# Patient Record
Sex: FEMALE | Race: WHITE
[De-identification: names, ages, dates, MRNs, and addresses within clinical notes are randomized per-mention and may not be internally consistent; named-entity substitution may affect disease eponyms.]

---

## 2017-12-29 ENCOUNTER — HOSPITAL ENCOUNTER (OUTPATIENT)
Dept: HOSPITAL 47 - RADECHMAIN | Age: 77
Discharge: HOME | End: 2017-12-29
Payer: MEDICARE

## 2017-12-29 DIAGNOSIS — I08.1: Primary | ICD-10-CM

## 2017-12-29 DIAGNOSIS — I65.23: ICD-10-CM

## 2017-12-29 PROCEDURE — 93306 TTE W/DOPPLER COMPLETE: CPT

## 2017-12-29 PROCEDURE — 93880 EXTRACRANIAL BILAT STUDY: CPT

## 2017-12-29 NOTE — US
EXAMINATION TYPE: US carotid duplex BILAT

 

DATE OF EXAM: 12/29/2017

 

COMPARISON: NONE

 

CLINICAL HISTORY: I65.23 Occlusion/Stenosis,I34.0 Nonrheumatic mitral.

 

EXAM MEASUREMENTS: 

 

RIGHT:  Peak Systolic Velocity (PSV) cm/sec

----- Right CCA:  66.1  

----- Right ICA:  87.1     

----- Right ECA:  61.2   

ICA/CCA ratio:  1.3    

 

RIGHT:  End Diastole cm/sec

----- Right CCA:  27.3   

----- Right ICA:  38.6      

----- Right ECA:  11.6     

 

LEFT:  Peak Systolic Velocity (PSV) cm/sec

----- Left CCA:  62.5  

----- Left ICA:  87.1   

----- Left ECA:  40.0  

ICA/CCA ratio:  1.4  

 

LEFT:  End Diastole cm/sec

----- Left CCA:  30.7  

----- Left ICA:  33.8   

----- Left ECA:  8.6 

 

VERTEBRALS (direction of flow):

Right Vertebral: Antegrade

Left Vertebral: Antegrade

 

Rhythm:  Normal

 

 

Grayscale images show mild eccentric plaque at right carotid bulb. There is more moderate eccentric p
laque at left carotid bulb. Velocity measurements and ratios remain within normal limits in visualize
d portion of both internal carotid arteries.

 

IMPRESSION:  There is mild to moderate left greater than right atherosclerotic change without hemodyn
amically significant stenosis seen in either internal carotid artery.   

 

 

Criteria for Assigning % of Stenosis / Diameter reduction

(Estimation based on the indirect measurements of the internal carotid artery velocities (ICA PSV).

1.  Normal (no stenosis)=ICA PSV < 125 cm/s: ratio < 2.0: ICA EDV<40 cm/s.

2. Less than 50% stenosis=ICA PSV < 125 cm/s: ratio < 2.0: ICA EDV<40 cm/s.

3.  50 to 69% stenosis=ICA PSV of 125 to 230 cm/s: ration 2.0 ? 4.0: ICA EDV  cm/s.

4.  Greater than 70% stenosis to near occlusion= ICA PSV > 230 cm/s: ratio > 4.0: ICA EDV > 100 cm/s.
 

5.  Near occlusion= ICA PSV velocities may be low or undetectable: variable ratio and ICA EDV.

6.  Total occlusion=unable to detect flow.

## 2017-12-30 NOTE — ECHOF
Referral Reason:I65.23 Occlusion/Stenosis,I34.0 Nonrheumatic eyal



MEASUREMENTS

--------

HEIGHT: 132.1 cm

WEIGHT: 63.0 kg

BP: 

IVSd:   0.9 cm     (0.6 - 1.1)

LVIDd:   3.8 cm     (3.9 - 5.3)

LVPWd:   1.1 cm     (0.6 - 1.1)

IVSs:   1.3 cm

LVIDs:   2.2 cm

LVPWs:   1.0 cm

Ao Diam:   2.8 cm     (2.0 - 3.7)

AV Cusp:   1.8 cm     (1.5 - 2.6)

LA Diam:   3.4 cm     (2.7 - 3.8)

MV EXCURSION:   14.317 mm     (> 18.000)

MV EF SLOPE:   76 mm/s     (70 - 150)

EPSS:   0.3 cm

MV E Augustus:   0.55 m/s

MV DecT:   244 ms

MV A Augustus:   0.94 m/s

MV E/A Ratio:   0.59 

RAP:   5.00 mmHg

RVSP:   31.41 mmHg







FINDINGS

--------

Sinus rhythm.

This was a technically good study.

LV size, wall thickness and systolic function are normal, with an EF greater than 55%.

The right ventricle is normal in size.

The left atrial size is normal.

The right atrial size is normal.

The aortic valve is trileaflet, and appears structurally normal. No aortic stenosis or regurgitation.


Mild mitral annular calcification present.   Mild mitral regurgitation is present.

Mild tricuspid regurgitation present.   There is no evidence of pulmonary hypertension.   The right v
entricular systolic pressure, as measured by Doppler, is 31.41mmHg.

There is no pulmonic regurgitation present.

The aortic root size is normal.

There is no pericardial effusion.



CONCLUSIONS

--------

1. Sinus rhythm.

2. This was a technically good study.

3. LV size, wall thickness and systolic function are normal, with an EF greater than 55%.

4. The left atrial size is normal.

5. The aortic valve is trileaflet, and appears structurally normal. No aortic stenosis or regurgitati
on.

6. Mild mitral regurgitation is present.

7. Mild tricuspid regurgitation present.

8. There is no evidence of pulmonary hypertension.

9. There is no pulmonic regurgitation present.

10. The aortic root size is normal.

11. There is no pericardial effusion.





SONOGRAPHER: Peggy James RDCS

## 2018-01-18 ENCOUNTER — HOSPITAL ENCOUNTER (OUTPATIENT)
Dept: HOSPITAL 47 - RADBDWWP | Age: 78
Discharge: HOME | End: 2018-01-18
Payer: MEDICARE

## 2018-01-18 ENCOUNTER — HOSPITAL ENCOUNTER (OUTPATIENT)
Dept: HOSPITAL 47 - RADMAMWWP | Age: 78
Discharge: HOME | End: 2018-01-18
Payer: MEDICARE

## 2018-01-18 DIAGNOSIS — M85.80: Primary | ICD-10-CM

## 2018-01-18 DIAGNOSIS — R92.2: Primary | ICD-10-CM

## 2018-01-18 PROCEDURE — 77080 DXA BONE DENSITY AXIAL: CPT

## 2018-01-18 PROCEDURE — 77066 DX MAMMO INCL CAD BI: CPT

## 2018-01-18 NOTE — BD
EXAMINATION TYPE: MG DEXA axial skeleton.  

 

DATE OF EXAM: 1/18/2018

 

COMPARISON: NONE

 

CLINICAL HISTORY: 77 YR OLD FEMALE....ICD-10 CODE:    M89.9 DISORDER OF BONE

 

 

Height:  58

Weight:  137

 

FRAX RISK QUESTIONS:

Alcohol (3 or more units per day):  NO

Family History (Parent hip fracture):  NO

Glucocorticoids (More than 3mos):  NO

           (Ex: prednisone, prednisolone, methylprednisolone, dexamethasone, and hydrocortisone).    
     

History of Fracture in Adulthood: NO

Secondary Osteoporosis: NO

  1.  Type 1 Diabetes: NO

  2.  Hyperthyroidism: NO

  3.  Menopause before 45: NO

  4.  Malnutrition: NO

  5.  Chronic liver disease: NO

Rheumatoid Arthritis: NO

Current Tobacco Use: VAP, YES

 

RISK FACTORS 

HISTORY OF: 

Family History of Osteoporosis: YES, DAUGHTER 

Active: YES

Diet low in dairy products/other sources of calcium:  YES, LOW

Postmenopausal woman: AT AGE 55 YRS OLD

Lost more than 2 inches in height since high school: YES

Hyperparathyroidism: NO

Adrenal Insufficiency: NO

 

MEDICATIONS: 

 

Thyroid Medications:  YES, SYNTHROID

How Long: 15 YRS

Additional Medications: BP MEDS, LORAZEPAM, ASPIRIN, REFLUX MEDS, STATINS FOR CHOLESTEROL,   

 

 

Additional History: HYPERTENSION,

 

 

EXAM MEASUREMENTS: 

Bone mineral densitometry was performed using the hipix System.

Bone mineral density as measured about the Lumbar spine is:  

----- L1-L4(G/cm2): 1.401

T Score Values are as follows:

----- L1:  1.0

----- L2:  1.1

----- L3:  3.2

----- L4:  1.9

----- L1-L4: 1.8

Bone mineral density  FIRST BONE DENSITY TEST HERE AT [facility]

 

Bone mineral density about the R hip (g/cm2): 0.989

Bone mineral density about the L hip (g/cm2):  0.950

T Score values are as follows:

-----R Neck: -1.6

-----L Neck:  -1.7

-----R Total:  -0.1

-----L Total: -0.5

 

 

FRAX%'S:   THERE IS A 13.0% CHANCE OF A MAJOR OSTEOPOROTIC FX AND A 4.5% FOR HIP FX.....PROBABILITY O
F FX IN 10 YRS TIME

 

IMPRESSION:

Osteopenia (T Score between -2.5 and -1 as noted by T score values

There is slightly increased risk of fracture and the patient may be considered 

for treatment. Re-Screen 2-5 years.

 

 

 

 

 

NOTE:  T-SCORE=SD OF THE YOUNG ADULT MEAN.

## 2018-01-19 NOTE — MM
Reason for exam: clinical finding.

Last mammogram was performed 13 years and 9 months ago.



History:

Patient is postmenopausal.

Family history of breast cancer.

Indicated problem(s): palpable abnormality and lump or thickening in the left 

breast.



Physical Findings:

Nurse did not find any significant physical abnormalities on exam.



MG 3D Diag Mammo W/Cad GARTH

Bilateral CC and MLO view(s) were taken.

Prior study comparison: April 12, 2004, bilateral screening mammogram.  December 27, 2002, bilateral special view mammogram.

There are scattered fibroglandular densities.  Intramammary lymph node upper outer

quadrant bilaterally.

No significant new findings when compared with previous films.



These results were verbally communicated with the patient and result sheet given 

to the patient on 01/18/18.





ASSESSMENT: Benign, BI-RAD 2



RECOMMENDATION:

Routine screening mammogram of both breasts in 1 year.



Manage patient on a clinical basis.

## 2019-04-08 ENCOUNTER — HOSPITAL ENCOUNTER (OUTPATIENT)
Dept: HOSPITAL 47 - RADMAMWWP | Age: 79
Discharge: HOME | End: 2019-04-08
Attending: INTERNAL MEDICINE
Payer: MEDICARE

## 2019-04-08 DIAGNOSIS — Z12.31: Primary | ICD-10-CM

## 2019-04-08 PROCEDURE — 77067 SCR MAMMO BI INCL CAD: CPT

## 2019-04-08 PROCEDURE — 77063 BREAST TOMOSYNTHESIS BI: CPT

## 2019-04-10 NOTE — MM
Reason for exam: screening  (asymptomatic).

Last mammogram was performed 1 year and 3 months ago.



History:

Patient is postmenopausal.



Physical Findings:

A clinical breast exam by your physician is recommended on an annual basis and 

results should be correlated with mammographic findings.



MG 3D Screening Mammo W/Cad

Bilateral CC and MLO view(s) were taken.

Prior study comparison: January 18, 2018, bilateral MG 3d diag mammo w/cad GARTH.  

April 12, 2004, bilateral screening mammogram.

There are scattered fibroglandular densities.  No suspicious abnormality.  No 

significant changes when compared with prior studies.





ASSESSMENT: Negative, BI-RAD 1



RECOMMENDATION:

Routine screening mammogram of both breasts in 1 year.

## 2020-02-20 ENCOUNTER — HOSPITAL ENCOUNTER (OUTPATIENT)
Dept: HOSPITAL 47 - RADECHMAIN | Age: 80
Discharge: HOME | End: 2020-02-20
Attending: INTERNAL MEDICINE
Payer: MEDICARE

## 2020-02-20 DIAGNOSIS — I27.20: ICD-10-CM

## 2020-02-20 DIAGNOSIS — I65.23: Primary | ICD-10-CM

## 2020-02-20 DIAGNOSIS — I08.1: ICD-10-CM

## 2020-02-20 PROCEDURE — 93880 EXTRACRANIAL BILAT STUDY: CPT

## 2020-02-20 PROCEDURE — 93306 TTE W/DOPPLER COMPLETE: CPT

## 2020-02-20 NOTE — US
EXAMINATION TYPE: US carotid duplex BILAT

 

DATE OF EXAM: 2/20/2020

 

COMPARISON: NONE

 

CLINICAL HISTORY: 79-year-old female I65.23 CAROTID OCCLUSION AND STENOSIS. Stenosis

 

TECHNIQUE: Carotid duplex ultrasound examination. In the right Doppler criteria was utilized.

 

FINDINGS:

 

EXAM MEASUREMENTS: 

 

RIGHT:  Peak Systolic Velocity (PSV) cm/sec

----- Right CCA:  69.0  

----- Right ICA:  81.5     

----- Right ECA:  67.9   

ICA/CCA ratio:  1.2    

 

RIGHT:  End Diastole cm/sec

----- Right CCA:  31.1   

----- Right ICA:  29.2      

----- Right ECA:  14.3     

 

LEFT:  Peak Systolic Velocity (PSV) cm/sec

----- Left CCA:  78.8  

----- Left ICA:  84.3   

----- Left ECA:  83.6  

ICA/CCA ratio:  1.1  

 

LEFT:  End Diastole cm/sec

----- Left CCA:  29.9  

----- Left ICA:  31.9   

----- Left ECA:  20.4 

 

VERTEBRALS (direction of flow):

Right Vertebral: Antegrade

Left Vertebral: Antegrade

 

Rhythm:  Normal

 

Sonographer notes: The vessels dive deep. No significant stenosis seen

 

 

 

IMPRESSION:  

No hemodynamically significant ICA stenosis on either side.

   

 

 

Criteria for Assigning % of Stenosis / Diameter reduction

(Estimation based on the indirect measurements of the internal carotid artery velocities (ICA PSV).

1.  Normal (no stenosis)=ICA PSV < 125 cm/s: ratio < 2.0: ICA EDV<40 cm/s.

2. Less than 50% stenosis=ICA PSV < 125 cm/s: ratio < 2.0: ICA EDV<40 cm/s.

3.  50 to 69% stenosis=ICA PSV of 125 to 230 cm/s: ration 2.0 ? 4.0: ICA EDV  cm/s.

4.  Greater than 70% stenosis to near occlusion= ICA PSV > 230 cm/s: ratio > 4.0: ICA EDV > 100 cm/s.
 

5.  Near occlusion= ICA PSV velocities may be low or undetectable: variable ratio and ICA EDV.

6.  Total occlusion=unable to detect flow.

## 2020-02-20 NOTE — ECHOF
Referral Reason:I34.0 Nonrheumatic mitral (valve) insufficiency



MEASUREMENTS

--------

HEIGHT: 147.3 cm

WEIGHT: 61.2 kg

BP: 

IVSd:   1.2 cm     (0.6 - 1.1)

LVIDd:   3.3 cm     (3.9 - 5.3)

LVPWd:   1.0 cm     (0.6 - 1.1)

IVSs:   1.2 cm

LVIDs:   2.3 cm

LVPWs:   1.5 cm

RVIDd:   3.0 cm     (< 3.3)

LAESV Index (A-L):   17.99 ml/m

Ao Diam:   2.0 cm     (2.0 - 3.7)

AV Cusp:   1.6 cm     (1.5 - 2.6)

EPSS:   0.4 cm

MV E Augustus:   0.92 m/s

MV DecT:   236 ms

MV A Augustus:   1.18 m/s

MV E/A Ratio:   0.78 

RAP:   5.00 mmHg

RVSP:   35.15 mmHg

MV EF SLOPE:   29.09 mm/s     (70 - 150)

MV EXCURSION:   11.54 mm     (> 18.000)







FINDINGS

--------

Sinus rhythm.

This was a technically adequate study.

The left ventricular size is normal.   There is mild concentric left ventricular hypertrophy.   Overa
ll left ventricular systolic function is normal with, an EF between 55 - 60 %.   The diastolic fillin
g pattern is normal for the age of the patient 13.49.

The right ventricle is normal in size.

Normal LA  size by volume 22+/-6 ml/m2.

The right atrial size is normal.

Interatrial and interventricular septum intact.

There is no evidence of aortic regurgitation.   There is no evidence of aortic stenosis.

Mild mitral regurgitation is present.

Mild tricuspid regurgitation present.   There is mild pulmonary hypertension.   The right ventricular
 systolic pressure, as measured by Doppler, is 35.15mmHg.

There is no pulmonic regurgitation present.

The aortic root size is normal.

Normal inferior vena cava with normal inspiratory collapse consistent with estimated right atrial pre
ssure of  5 mmHg.

There is no pericardial effusion.



CONCLUSIONS

--------

1. Sinus rhythm.

2. This was a technically adequate study.

3. The left ventricular size is normal.

4. There is mild concentric left ventricular hypertrophy.

5. Overall left ventricular systolic function is normal with, an EF between 55 - 60 %.

6. The diastolic filling pattern is normal for the age of the patient 13.49

7. The right ventricle is normal in size.

8. Normal LA size by volume 22+/-6 ml/m2.

9. The right atrial size is normal.

10. Interatrial and interventricular septum intact.

11. There is no evidence of aortic regurgitation.

12. There is no evidence of aortic stenosis.

13. Mild mitral regurgitation is present.

14. Mild tricuspid regurgitation present.

15. There is mild pulmonary hypertension.

16. The right ventricular systolic pressure, as measured by Doppler, is 35.15mmHg.

17. There is no pulmonic regurgitation present.

18. The aortic root size is normal.

19. Normal inferior vena cava with normal inspiratory collapse consistent with estimated right atrial
 pressure of  5 mmHg.

20. There is no pericardial effusion.





SONOGRAPHER: Susie Granda RDCS

## 2021-06-28 ENCOUNTER — HOSPITAL ENCOUNTER (OUTPATIENT)
Dept: HOSPITAL 47 - RADECHMAIN | Age: 81
Discharge: HOME | End: 2021-06-28
Attending: FAMILY MEDICINE
Payer: MEDICARE

## 2021-06-28 DIAGNOSIS — I08.8: Primary | ICD-10-CM

## 2021-06-28 DIAGNOSIS — I27.20: ICD-10-CM

## 2021-06-28 PROCEDURE — 93306 TTE W/DOPPLER COMPLETE: CPT

## 2021-06-28 PROCEDURE — 93005 ELECTROCARDIOGRAM TRACING: CPT

## 2021-06-29 NOTE — ECHOF
Referral Reason:R01.1 Cardiac Murmur



MEASUREMENTS

--------

HEIGHT: 149.9 cm

WEIGHT: 58.1 kg

BP: 145/82

RVIDd:   2.8 cm     (< 3.3)

IVSd:   1.1 cm     (0.6 - 1.1)

LVIDd:   3.7 cm     (3.9 - 5.3)

LVPWd:   1.0 cm     (0.6 - 1.1)

IVSs:   1.5 cm

LVIDs:   2.4 cm

LVPWs:   1.2 cm

LA Diam:   2.7 cm     (2.7 - 3.8)

LAESV Index (A-L):   21.42 ml/m

Ao Diam:   2.9 cm     (2.0 - 3.7)

AV Cusp:   1.8 cm     (1.5 - 2.6)

MV EXCURSION:   13.970 mm     (> 18.000)

MV EF SLOPE:   49 mm/s     (70 - 150)

EPSS:   0.4 cm

MV E Augustus:   1.06 m/s

MV DecT:   259 ms

MV A Augustus:   1.26 m/s

MV E/A Ratio:   0.84 

RAP:   5.00 mmHg

RVSP:   36.87 mmHg







FINDINGS

--------

Sinus rhythm.

This was a technically adequate study.

The left ventricular size is normal.   There is borderline concentric left ventricular hypertrophy.  
 Overall left ventricular systolic function is normal with, an EF between 60 - 65 %.

The right ventricle is normal in size.

Normal LA  size by volume 22+/-6 ml/m2.

The right atrium is normal in size.

Interatrial and interventricular septum intact.

There is mild aortic valve sclerosis.

The mitral valve leaflets are mildly thickened.   Mild mitral annular calcification present.   There 
is trace to mild mitral regurgitation.

Mild tricuspid regurgitation present.   There is mild pulmonary hypertension.   The right ventricular
 systolic pressure, as measured by Doppler, is 36.87mmHg.

Trace/mild (physiologic)  pulmonic regurgitation.

The aortic root size is normal.

Normal inferior vena cava with normal inspiratory collapse consistent with estimated right atrial pre
ssure of  5 mmHg.

There is no pericardial effusion.



CONCLUSIONS

--------

1. The left ventricular size is normal.

2. There is borderline concentric left ventricular hypertrophy.

3. Overall left ventricular systolic function is normal with, an EF between 60 - 65 %.

4. There is mild aortic valve sclerosis.

5. The mitral valve leaflets are mildly thickened.

6. Mild mitral annular calcification present.

7. There is trace to mild mitral regurgitation.

8. Mild tricuspid regurgitation present.

9. There is mild pulmonary hypertension.

10. The right ventricular systolic pressure, as measured by Doppler, is 36.87mmHg.

11. Trace/mild (physiologic)  pulmonic regurgitation.

12. There is no pericardial effusion.





SONOGRAPHER: Kathryn Harding RDCS

## 2021-06-30 ENCOUNTER — HOSPITAL ENCOUNTER (OUTPATIENT)
Dept: HOSPITAL 47 - RADBDWWP | Age: 81
Discharge: HOME | End: 2021-06-30
Attending: FAMILY MEDICINE
Payer: MEDICARE

## 2021-06-30 DIAGNOSIS — Z78.0: ICD-10-CM

## 2021-06-30 DIAGNOSIS — M85.89: ICD-10-CM

## 2021-06-30 DIAGNOSIS — Z13.820: Primary | ICD-10-CM

## 2021-06-30 PROCEDURE — 77080 DXA BONE DENSITY AXIAL: CPT

## 2022-07-18 ENCOUNTER — HOSPITAL ENCOUNTER (OUTPATIENT)
Dept: HOSPITAL 47 - RADXRMAIN | Age: 82
Discharge: HOME | End: 2022-07-18
Attending: NURSE PRACTITIONER
Payer: MEDICARE

## 2022-07-18 DIAGNOSIS — R10.9: ICD-10-CM

## 2022-07-18 DIAGNOSIS — R31.9: Primary | ICD-10-CM

## 2022-07-18 PROCEDURE — 74018 RADEX ABDOMEN 1 VIEW: CPT

## 2022-07-18 NOTE — XR
EXAMINATION TYPE: XR KUB

 

DATE OF EXAM: 7/18/2022

 

COMPARISON: NONE

 

HISTORY: Pain

 

TECHNIQUE: Single supine KUB image of the abdomen is obtained

 

FINDINGS:  

Small bowel demonstrates no evidence for dilatation or air fluid levels.  

 

Gas and fecal material is seen in non-distended colon.  

 

No convincing evidence for pneumoperitoneum.

 

 No unusual calcifications. 

 

The lung bases are clear. 

 

The osseous structures are intact. Mild fecal stasis noted.

 

IMPRESSION:  

 

1.  Overall nonobstructive bowel gas pattern.

## 2022-11-07 ENCOUNTER — HOSPITAL ENCOUNTER (OUTPATIENT)
Dept: HOSPITAL 47 - LABWHC1 | Age: 82
Discharge: HOME | End: 2022-11-07
Attending: UROLOGY
Payer: MEDICARE

## 2022-11-07 DIAGNOSIS — Z01.812: Primary | ICD-10-CM

## 2022-11-07 DIAGNOSIS — C66.2: ICD-10-CM

## 2022-11-07 LAB
BASOPHILS # BLD AUTO: 0.04 X 10*3/UL (ref 0–0.1)
BASOPHILS NFR BLD AUTO: 0.6 %
EOSINOPHIL # BLD AUTO: 0.17 X 10*3/UL (ref 0.04–0.35)
EOSINOPHIL NFR BLD AUTO: 2.5 %
ERYTHROCYTE [DISTWIDTH] IN BLOOD BY AUTOMATED COUNT: 4.85 X 10*6/UL (ref 4.1–5.2)
ERYTHROCYTE [DISTWIDTH] IN BLOOD: 15.7 % (ref 11.5–14.5)
HCT VFR BLD AUTO: 44.2 % (ref 37.2–46.3)
HGB BLD-MCNC: 14.2 G/DL (ref 12–15)
IMM GRANULOCYTES BLD QL AUTO: 0.3 %
LYMPHOCYTES # SPEC AUTO: 1.67 X 10*3/UL (ref 0.9–5)
LYMPHOCYTES NFR SPEC AUTO: 24.5 %
MCH RBC QN AUTO: 29.3 PG (ref 27–32)
MCHC RBC AUTO-ENTMCNC: 32.1 G/DL (ref 32–37)
MCV RBC AUTO: 91.1 FL (ref 80–97)
MONOCYTES # BLD AUTO: 0.79 X 10*3/UL (ref 0.2–1)
MONOCYTES NFR BLD AUTO: 11.6 %
NEUTROPHILS # BLD AUTO: 4.12 X 10*3/UL (ref 1.8–7.7)
NEUTROPHILS NFR BLD AUTO: 60.5 %
NRBC BLD AUTO-RTO: 0 /100 WBCS (ref 0–0)
PLATELET # BLD AUTO: 261 X 10*3/UL (ref 140–440)
WBC # BLD AUTO: 6.81 X 10*3/UL (ref 4.5–10)

## 2022-11-07 PROCEDURE — 86850 RBC ANTIBODY SCREEN: CPT

## 2022-11-07 PROCEDURE — 80048 BASIC METABOLIC PNL TOTAL CA: CPT

## 2022-11-07 PROCEDURE — 85025 COMPLETE CBC W/AUTO DIFF WBC: CPT

## 2022-11-07 PROCEDURE — 36415 COLL VENOUS BLD VENIPUNCTURE: CPT

## 2022-11-07 PROCEDURE — 86900 BLOOD TYPING SEROLOGIC ABO: CPT

## 2022-11-07 PROCEDURE — 86901 BLOOD TYPING SEROLOGIC RH(D): CPT

## 2022-11-08 LAB
ANION GAP SERPL CALC-SCNC: 9.7 MMOL/L (ref 10–18)
BUN SERPL-SCNC: 10.4 MG/DL (ref 9–27)
BUN/CREAT SERPL: 9.45 RATIO (ref 12–20)
CALCIUM SPEC-MCNC: 10 MG/DL (ref 8.7–10.3)
CHLORIDE SERPL-SCNC: 106 MMOL/L (ref 96–109)
CO2 SERPL-SCNC: 27.3 MMOL/L (ref 20–27.5)
GLUCOSE SERPL-MCNC: 77 MG/DL (ref 70–110)
POTASSIUM SERPL-SCNC: 4.9 MMOL/L (ref 3.5–5.5)
SODIUM SERPL-SCNC: 143 MMOL/L (ref 135–145)

## 2022-11-15 ENCOUNTER — HOSPITAL ENCOUNTER (OUTPATIENT)
Dept: HOSPITAL 47 - LABPAT | Age: 82
Discharge: HOME | End: 2022-11-15
Attending: UROLOGY
Payer: MEDICARE

## 2022-11-15 DIAGNOSIS — Z01.812: Primary | ICD-10-CM

## 2022-11-15 DIAGNOSIS — C66.2: ICD-10-CM

## 2022-11-15 PROCEDURE — 93005 ELECTROCARDIOGRAM TRACING: CPT

## 2022-11-15 NOTE — P.GSHP
History of Present Illness


H&P Date: 11/15/22


Chief Complaint: Gross hematuria


The patient is an 82-year-old white female who presented with a four-month 

history of intermittent gross hematuria associated with left flank pain.  

Computed tomography scan showed a questionable soft tissue filling defect within

the left distal ureter at the ureterovesical junction.  She underwent cystoscopy

with left ureteroscopy.  No bladder tumors were seen, but she was confirmed to 

have a large tumor within the left distal ureter.  Biopsies showed high-grade, 

noninvasive urothelial carcinoma.  She was offered the options of a left 

nephroureterectomy versus a left distal ureterectomy with reimplant and has 

elected to undergo the latter.








- Constitutional


Constitutional: Reports weakness, Denies chills, Denies fever





- Cardiovascular


Cardiovascular: Reports high blood pressure





- Respiratory


Respiratory: Reports dyspnea





- Gastrointestinal


Gastrointestinal: Reports heartburn





- Genitourinary (Female)


Genitourinary: Reports flank pain, Reports hematuria





Past Medical History


Past Medical History: Hyperlipidemia, Hypertension, Thyroid Disorder


Additional Past Medical History / Comment(s): cancer in the left ureter. removed

cancer and reattach . 2 months of UTI and not being cleared. saw Dr Pena and 

DX, Hx of bronchitis started using inhaler. uses for wheezing and SOB. some 

panic issues. occasional heartburn, arthritis in hands, arms and legs. some 

numbness in hands at times. leg cramps. cataracts removed bilaterally. seasonal 

allergies.


History of Any Multi-Drug Resistant Organisms: None Reported


Past Surgical History:  Section, Hernia Repair


Additional Past Surgical History / Comment(s): left great toe amputation.  

Hiatal hernia repair.   rt  parathryoid removed


Past Anesthesia/Blood Transfusion Reactions: No Reported Reaction


Additional Past Anesthesia/Blood Transfusion Reaction / Comment(s): blood 

transfusion as a baby. - no issues


Smoking Status: Current every day smoker





- Past Family History


  ** Mother


History Unknown: Yes


Additional Family Medical History / Comment(s): adopted





Medications and Allergies


                                Home Medications











 Medication  Instructions  Recorded  Confirmed  Type


 


ALPRAZolam [Xanax] 0.25 mg PO TID PRN 22 History


 


Acetaminophen Tab [Tylenol Tab] 500 mg PO AS DIRECTED PRN 22 

History


 


Donepezil [Aricept] 10 mg PO HS 22 History


 


Ipratropium-Albuterol Nebulize 3 ml INHALATION TID PRN 22 History





[Duoneb 0.5 mg-3 mg/3 ml Soln]    


 


Levothyroxine Sodium [Synthroid] 50 mcg PO DAILY 22 History


 


Rosuvastatin Calcium 20 mg PO HS 22 History


 


Unk Vitamin B12  1 tab PO DAILY 22 History


 


dilTIAZem HCL [Cardizem CD] 240 mg PO HS 22 History








                                    Allergies











Allergy/AdvReac Type Severity Reaction Status Date / Time


 


adhesive tape Allergy  Rash/Hives Verified 22 15:16


 


contact metal agent Allergy  Rash/Hives Verified 22 14:10


 


latex Allergy  Rash/Hives Verified 22 15:17


 


meperidine [From Demerol] Allergy  Nausea & Verified 22 15:17





   Vomiting  














Surgical - Exam





- General


well developed, well nourished, no distress





- Neck


no masses, trachea midline





- Respiratory


normal respiratory effort





- Abdomen


Abdomen: soft, non tender, no guarding, no rigid, no rebound





- Psychiatric


oriented to time, oriented to person, oriented to place, speech is normal, 

memory intact





Results





- Imaging


CT scan - abdomen: report reviewed, image reviewed





Assessment and Plan


(1) Malignant neoplasm of left ureter


Status: Acute   Code(s): C66.2 - MALIGNANT NEOPLASM OF LEFT URETER   SNOMED 

Code(s): 456251558


   


Plan: 


Left distal ureterectomy with ureteral reimplant, pelvic lymphadenectomy.  The 

procedure has been reviewed in detail with the patient and her daughter.  They 

have been made aware of potential risks, which include anesthesia, bleeding, 

infection, lymphocele, urinary leak, ureteral stricture, recurrent malignancy.

## 2022-11-16 ENCOUNTER — HOSPITAL ENCOUNTER (INPATIENT)
Dept: HOSPITAL 47 - 2ORMAIN | Age: 82
LOS: 3 days | Discharge: HOME HEALTH | DRG: 658 | End: 2022-11-19
Attending: UROLOGY | Admitting: UROLOGY
Payer: MEDICARE

## 2022-11-16 DIAGNOSIS — Z87.440: ICD-10-CM

## 2022-11-16 DIAGNOSIS — Z89.412: ICD-10-CM

## 2022-11-16 DIAGNOSIS — F17.210: ICD-10-CM

## 2022-11-16 DIAGNOSIS — Z91.040: ICD-10-CM

## 2022-11-16 DIAGNOSIS — Z79.899: ICD-10-CM

## 2022-11-16 DIAGNOSIS — Z79.890: ICD-10-CM

## 2022-11-16 DIAGNOSIS — Z28.310: ICD-10-CM

## 2022-11-16 DIAGNOSIS — I10: ICD-10-CM

## 2022-11-16 DIAGNOSIS — C66.2: Primary | ICD-10-CM

## 2022-11-16 DIAGNOSIS — E89.2: ICD-10-CM

## 2022-11-16 DIAGNOSIS — E07.9: ICD-10-CM

## 2022-11-16 DIAGNOSIS — Z88.5: ICD-10-CM

## 2022-11-16 DIAGNOSIS — J44.9: ICD-10-CM

## 2022-11-16 DIAGNOSIS — E78.5: ICD-10-CM

## 2022-11-16 DIAGNOSIS — M15.9: ICD-10-CM

## 2022-11-16 DIAGNOSIS — R31.0: ICD-10-CM

## 2022-11-16 PROCEDURE — 0TB70ZZ EXCISION OF LEFT URETER, OPEN APPROACH: ICD-10-PCS

## 2022-11-16 PROCEDURE — 88305 TISSUE EXAM BY PATHOLOGIST: CPT

## 2022-11-16 PROCEDURE — 0TS70ZZ REPOSITION LEFT URETER, OPEN APPROACH: ICD-10-PCS

## 2022-11-16 PROCEDURE — 94760 N-INVAS EAR/PLS OXIMETRY 1: CPT

## 2022-11-16 PROCEDURE — 86901 BLOOD TYPING SEROLOGIC RH(D): CPT

## 2022-11-16 PROCEDURE — 86850 RBC ANTIBODY SCREEN: CPT

## 2022-11-16 PROCEDURE — 88331 PATH CONSLTJ SURG 1 BLK 1SPC: CPT

## 2022-11-16 PROCEDURE — 86900 BLOOD TYPING SEROLOGIC ABO: CPT

## 2022-11-16 PROCEDURE — 93005 ELECTROCARDIOGRAM TRACING: CPT

## 2022-11-16 PROCEDURE — 94640 AIRWAY INHALATION TREATMENT: CPT

## 2022-11-16 PROCEDURE — 07BC0ZX EXCISION OF PELVIS LYMPHATIC, OPEN APPROACH, DIAGNOSTIC: ICD-10-PCS

## 2022-11-16 PROCEDURE — 88307 TISSUE EXAM BY PATHOLOGIST: CPT

## 2022-11-16 RX ADMIN — DILTIAZEM HYDROCHLORIDE SCH MG: 240 CAPSULE, COATED, EXTENDED RELEASE ORAL at 20:54

## 2022-11-16 RX ADMIN — ATORVASTATIN CALCIUM SCH MG: 40 TABLET, FILM COATED ORAL at 20:55

## 2022-11-16 RX ADMIN — HEPARIN SODIUM SCH UNIT: 5000 INJECTION INTRAVENOUS; SUBCUTANEOUS at 20:55

## 2022-11-16 RX ADMIN — POTASSIUM CHLORIDE SCH: 14.9 INJECTION, SOLUTION INTRAVENOUS at 18:44

## 2022-11-16 RX ADMIN — POTASSIUM CHLORIDE SCH MLS: 14.9 INJECTION, SOLUTION INTRAVENOUS at 15:17

## 2022-11-16 RX ADMIN — DONEPEZIL HYDROCHLORIDE SCH MG: 10 TABLET ORAL at 20:55

## 2022-11-17 RX ADMIN — IPRATROPIUM BROMIDE AND ALBUTEROL SULFATE PRN ML: .5; 3 SOLUTION RESPIRATORY (INHALATION) at 19:00

## 2022-11-17 RX ADMIN — POTASSIUM CHLORIDE SCH MLS/HR: 14.9 INJECTION, SOLUTION INTRAVENOUS at 13:45

## 2022-11-17 RX ADMIN — POTASSIUM CHLORIDE SCH MLS/HR: 14.9 INJECTION, SOLUTION INTRAVENOUS at 09:21

## 2022-11-17 RX ADMIN — LEVOTHYROXINE SODIUM SCH MCG: 50 TABLET ORAL at 07:03

## 2022-11-17 RX ADMIN — ATORVASTATIN CALCIUM SCH MG: 40 TABLET, FILM COATED ORAL at 20:21

## 2022-11-17 RX ADMIN — IPRATROPIUM BROMIDE AND ALBUTEROL SULFATE PRN ML: .5; 3 SOLUTION RESPIRATORY (INHALATION) at 07:58

## 2022-11-17 RX ADMIN — POTASSIUM CHLORIDE SCH: 14.9 INJECTION, SOLUTION INTRAVENOUS at 03:53

## 2022-11-17 RX ADMIN — DILTIAZEM HYDROCHLORIDE SCH MG: 240 CAPSULE, COATED, EXTENDED RELEASE ORAL at 20:21

## 2022-11-17 RX ADMIN — CYANOCOBALAMIN TAB 500 MCG SCH MCG: 500 TAB at 10:02

## 2022-11-17 RX ADMIN — DOCUSATE SODIUM SCH MG: 100 CAPSULE, LIQUID FILLED ORAL at 20:21

## 2022-11-17 RX ADMIN — HEPARIN SODIUM SCH UNIT: 5000 INJECTION INTRAVENOUS; SUBCUTANEOUS at 10:01

## 2022-11-17 RX ADMIN — ACETAMINOPHEN PRN MG: 325 TABLET, FILM COATED ORAL at 10:02

## 2022-11-17 RX ADMIN — HEPARIN SODIUM SCH UNIT: 5000 INJECTION INTRAVENOUS; SUBCUTANEOUS at 20:21

## 2022-11-17 RX ADMIN — DONEPEZIL HYDROCHLORIDE SCH MG: 10 TABLET ORAL at 20:21

## 2022-11-17 NOTE — P.PN
Subjective


Progress Note Date: 11/17/22


Principal diagnosis: 


Left ureteral carcinoma


The patient is an 82-year-old white female who presented with a four-month 

history of intermittent gross hematuria associated with left flank pain.  

Computed tomography scan showed a questionable soft tissue filling defect within

the left distal ureter at the ureterovesical junction.  She underwent cystoscopy

with left ureteroscopy.  No bladder tumors were seen, but she was confirmed to 

have a large tumor within the left distal ureter.  Biopsies showed high-grade, 

noninvasive urothelial carcinoma. On 11/16/22 the patient underwent a left 

distal ureterectomy with ureteral reimplant and left pelvic lymph node 

dissection with Dr. Pena.








Objective





- Vital Signs


Vital signs: 


                                   Vital Signs











Temp  98.6 F   11/17/22 02:00


 


Pulse  72   11/17/22 08:12


 


Resp  16   11/17/22 02:00


 


BP  104/67   11/17/22 02:00


 


Pulse Ox  96   11/17/22 08:02


 


FiO2      








                                 Intake & Output











 11/16/22 11/17/22 11/17/22





 18:59 06:59 18:59


 


Intake Total 2250  


 


Output Total 300 890 


 


Balance 1950 -890 


 


Weight 54.3 kg  


 


Intake:   


 


  IV 2250  


 


Output:   


 


  Drainage  90 


 


    Abdomen  90 


 


  Urine 200 800 


 


  Estimated Blood Loss 100  


 


Other:   


 


  Voiding Method  Indwelling Catheter 














- Exam


General: Well developed, well nourished. No acute distress.  


HEENT: Head is atraumatic, normocephalic. 


Lungs: Respirations even and nonlabored. On 2L NC


Abdomen/GI: Soft. + abdominal tenderness, midline incision dressing CDI, small 

amount of shadowing present around GARETH drain


: Mcdonough catheter present with hematuria present


Skin: Warm and dry


Neurologic: Awake, alert and oriented times 3. CN II-XII grossly intact. 


Psychiatric: Appropriate mood and affect.











Assessment and Plan


Assessment: 


The patient is resting in bed. She states her pain is well managed. She is 

experiencing some bladder pressure. No nausea or vomiting.  Hematuria noted in 

mcdonough bag, this is an expected finding.  Left abdominal GARETH drain with 

serosanguineous drainage.  She is afebrile and vitals are stable. 





(1) Malignant neoplasm of left ureter


Current Visit: No   Status: Acute   Code(s): C66.2 - MALIGNANT NEOPLASM OF LEFT 

URETER   SNOMED Code(s): 674924323


   


Plan: 





- IS 10 x an hour while awake


- Increase activity


- Advance diet as tolerated


- Continue current pain regimen


- Monitor GARETH drainage


- Leave Mcdonough catheter in place for a couple weeks to allow bladder to heal


- Stent removal in approximately one month in office


- Pathology report pending





Impression and plan of care have been directed as dictated by the signing 

physician.  Loni Pride nurse practitioner acting as scribe for signing 

physician.





Loni Pride [facility]


Palliative Care/Urology


[facility] [place] [id number]


Email: [email or web address]


The patient was been examined by me and I concur with the above note, Ulises Rucker

## 2022-11-18 RX ADMIN — POTASSIUM CHLORIDE SCH MLS/HR: 14.9 INJECTION, SOLUTION INTRAVENOUS at 02:00

## 2022-11-18 RX ADMIN — IPRATROPIUM BROMIDE AND ALBUTEROL SULFATE SCH ML: .5; 3 SOLUTION RESPIRATORY (INHALATION) at 19:22

## 2022-11-18 RX ADMIN — CYANOCOBALAMIN TAB 500 MCG SCH MCG: 500 TAB at 08:17

## 2022-11-18 RX ADMIN — ACETAMINOPHEN PRN MG: 325 TABLET, FILM COATED ORAL at 20:58

## 2022-11-18 RX ADMIN — DONEPEZIL HYDROCHLORIDE SCH MG: 10 TABLET ORAL at 20:58

## 2022-11-18 RX ADMIN — IPRATROPIUM BROMIDE AND ALBUTEROL SULFATE PRN ML: .5; 3 SOLUTION RESPIRATORY (INHALATION) at 15:26

## 2022-11-18 RX ADMIN — LEVOTHYROXINE SODIUM SCH MCG: 50 TABLET ORAL at 06:40

## 2022-11-18 RX ADMIN — ATORVASTATIN CALCIUM SCH MG: 40 TABLET, FILM COATED ORAL at 20:58

## 2022-11-18 RX ADMIN — HEPARIN SODIUM SCH UNIT: 5000 INJECTION INTRAVENOUS; SUBCUTANEOUS at 20:57

## 2022-11-18 RX ADMIN — POTASSIUM CHLORIDE SCH: 14.9 INJECTION, SOLUTION INTRAVENOUS at 07:49

## 2022-11-18 RX ADMIN — POTASSIUM CHLORIDE SCH MLS/HR: 14.9 INJECTION, SOLUTION INTRAVENOUS at 11:34

## 2022-11-18 RX ADMIN — IPRATROPIUM BROMIDE AND ALBUTEROL SULFATE PRN ML: .5; 3 SOLUTION RESPIRATORY (INHALATION) at 08:58

## 2022-11-18 RX ADMIN — IPRATROPIUM BROMIDE AND ALBUTEROL SULFATE PRN ML: .5; 3 SOLUTION RESPIRATORY (INHALATION) at 00:22

## 2022-11-18 RX ADMIN — DOCUSATE SODIUM SCH MG: 100 CAPSULE, LIQUID FILLED ORAL at 08:17

## 2022-11-18 RX ADMIN — DILTIAZEM HYDROCHLORIDE SCH MG: 240 CAPSULE, COATED, EXTENDED RELEASE ORAL at 20:58

## 2022-11-18 RX ADMIN — HEPARIN SODIUM SCH UNIT: 5000 INJECTION INTRAVENOUS; SUBCUTANEOUS at 08:17

## 2022-11-18 RX ADMIN — DOCUSATE SODIUM SCH MG: 100 CAPSULE, LIQUID FILLED ORAL at 20:58

## 2022-11-18 NOTE — P.PN
Subjective


Progress Note Date: 11/18/22


Principal diagnosis: 


Left ureteral carcinoma


The patient is an 82-year-old white female who presented with a four-month 

history of intermittent gross hematuria associated with left flank pain.  

Computed tomography scan showed a questionable soft tissue filling defect within

the left distal ureter at the ureterovesical junction.  She underwent cystoscopy

with left ureteroscopy.  No bladder tumors were seen, but she was confirmed to 

have a large tumor within the left distal ureter.  Biopsies showed high-grade, 

noninvasive urothelial carcinoma. On 11/16/22 the patient underwent a left 

distal ureterectomy with ureteral reimplant and left pelvic lymph node 

dissection with Dr. Pena.








Objective





- Vital Signs


Vital signs: 


                                   Vital Signs











Temp  98.1 F   11/18/22 02:27


 


Pulse  71   11/18/22 02:27


 


Resp  17   11/18/22 02:27


 


BP  96/60   11/18/22 02:27


 


Pulse Ox  90 L  11/18/22 02:27


 


FiO2      








                                 Intake & Output











 11/17/22 11/18/22 11/18/22





 18:59 06:59 18:59


 


Output Total 685 750 20


 


Balance -685 -750 -20


 


Output:   


 


  Drainage 35  20


 


    Abdomen 35  20


 


  Urine 650 750 


 


Other:   


 


  Voiding Method Indwelling Catheter Indwelling Catheter 














- Exam


General: Well developed, well nourished. No acute distress.  


HEENT: Head is atraumatic, normocephalic. 


Lungs: Respirations even and nonlabored. On RA


Abdomen/GI: Soft. + abdominal tenderness, midline incision dressing CDI, small 

amount of shadowing present around GARETH drain


: Mars catheter present with dark red urine


Skin: Warm and dry


Neurologic: Awake, alert and oriented times 3. CN II-XII grossly intact. 


Psychiatric: Appropriate mood and affect.











Assessment and Plan


Assessment: 


The patient is resting in bed eating breakfast. She states her pain better 

today. She has been able to ambulate in her room. No nausea or vomiting. Mars 

catheter draining dark red urine, this is an expected finding.  Left abdominal 

GARETH drain with serosanguineous drainage.  She is afebrile and vitals are stable. 





(1) Malignant neoplasm of left ureter


Current Visit: No   Status: Acute   Code(s): C66.2 - MALIGNANT NEOPLASM OF LEFT 

URETER   SNOMED Code(s): 811421795


   


Plan: 





- IS 10 x an hour while awake


- Increase activity


- Decrease IVF to 50cc/hr


- Continue current pain regimen


- Monitor GARETH drainage


- Leave Mars catheter in place for a couple weeks to allow bladder to heal


- Stent removal in approximately one month in office


- Pathology report pending


- Anticipate discharge in the next 24-48 hours





Impression and plan of care have been directed as dictated by the signing 

physician.  Loni Pride nurse practitioner acting as scribe for signing 

physician.





Loni Pride [facility]


Palliative Care/Urology


[facility] [id number]


Email: [email or web address]


The patient was evaluated by me and I concur with the above-mentioned note.,  

Ulises Calhoun M.D.

## 2022-11-19 VITALS — HEART RATE: 88 BPM

## 2022-11-19 VITALS — SYSTOLIC BLOOD PRESSURE: 107 MMHG | TEMPERATURE: 98 F | DIASTOLIC BLOOD PRESSURE: 53 MMHG | RESPIRATION RATE: 16 BRPM

## 2022-11-19 RX ADMIN — ACETAMINOPHEN PRN MG: 325 TABLET, FILM COATED ORAL at 12:56

## 2022-11-19 RX ADMIN — POTASSIUM CHLORIDE SCH: 14.9 INJECTION, SOLUTION INTRAVENOUS at 04:42

## 2022-11-19 RX ADMIN — DOCUSATE SODIUM SCH MG: 100 CAPSULE, LIQUID FILLED ORAL at 08:36

## 2022-11-19 RX ADMIN — IPRATROPIUM BROMIDE AND ALBUTEROL SULFATE SCH ML: .5; 3 SOLUTION RESPIRATORY (INHALATION) at 11:39

## 2022-11-19 RX ADMIN — POTASSIUM CHLORIDE SCH: 14.9 INJECTION, SOLUTION INTRAVENOUS at 13:02

## 2022-11-19 RX ADMIN — CYANOCOBALAMIN TAB 500 MCG SCH MCG: 500 TAB at 08:36

## 2022-11-19 RX ADMIN — LEVOTHYROXINE SODIUM SCH MCG: 50 TABLET ORAL at 06:40

## 2022-11-19 RX ADMIN — HEPARIN SODIUM SCH UNIT: 5000 INJECTION INTRAVENOUS; SUBCUTANEOUS at 08:36

## 2022-11-19 RX ADMIN — IPRATROPIUM BROMIDE AND ALBUTEROL SULFATE SCH ML: .5; 3 SOLUTION RESPIRATORY (INHALATION) at 07:46

## 2022-11-19 NOTE — P.DS
Providers


Date of admission: 


11/16/22 08:53





Attending physician: 


Lenny Pena





Primary care physician: 


Reid Stromberg








- Discharge Diagnosis(es)


(1) Malignant neoplasm of left ureter


Current Visit: No   Status: Acute   


Hospital Course: 





Patient is admitted to the hospital for a distal left ureterectomy with 

reimplantation.  She underwent this without difficulty.  Postoperatively she did

well.  Diet was slowly advanced.  She had related.  Her urine is remained a 

little bit bloody.  The GARETH drainage has been moderate as expected.  Her wound is

healing nicely.  Her vital signs are stable.  She is ambulating tolerating 

regular diet passing gas and pain under control.  She'll be discharged home 

later today with a GARETH drain and the Mars catheter.  She'll follow-up with 

Мария for staple removal and drain assessment.  Pathology shows a low-grade 

tumor, noninvasive with negative margins.  Lymph nodes are negative.


Patient Condition at Discharge: Good





Plan - Discharge Summary


Discharge Rx Participant: No


New Discharge Prescriptions: 


New


   HYDROcodone/APAP 5-325MG [Norco 5-325] 1 tab PO Q4HR PRN #14 tab


     PRN Reason: Pain





No Action


   Acetaminophen Tab [Tylenol Tab] 500 mg PO AS DIRECTED PRN


     PRN Reason: Pain


   Donepezil [Aricept] 10 mg PO HS


   Unk Vitamin B12  1 tab PO DAILY


   dilTIAZem HCL [Cardizem CD] 240 mg PO HS


   Levothyroxine Sodium [Synthroid] 50 mcg PO DAILY


   Rosuvastatin Calcium 20 mg PO HS


   ALPRAZolam [Xanax] 0.25 mg PO TID PRN


     PRN Reason: Anxiety


   Ipratropium-Albuterol Nebulize [Duoneb 0.5 mg-3 mg/3 ml Soln] 3 ml INHALATION

TID PRN


     PRN Reason: Wheezing


Discharge Medication List





ALPRAZolam [Xanax] 0.25 mg PO TID PRN 11/14/22 [History]


Acetaminophen Tab [Tylenol Tab] 500 mg PO AS DIRECTED PRN 11/14/22 [History]


Donepezil [Aricept] 10 mg PO HS 11/14/22 [History]


Ipratropium-Albuterol Nebulize [Duoneb 0.5 mg-3 mg/3 ml Soln] 3 ml INHALATION 

TID PRN 11/14/22 [History]


Levothyroxine Sodium [Synthroid] 50 mcg PO DAILY 11/14/22 [History]


Rosuvastatin Calcium 20 mg PO HS 11/14/22 [History]


Unk Vitamin B12  1 tab PO DAILY 11/14/22 [History]


dilTIAZem HCL [Cardizem CD] 240 mg PO HS 11/14/22 [History]


HYDROcodone/APAP 5-325MG [Norco 5-325] 1 tab PO Q4HR PRN #14 tab 11/19/22 [Rx]








Follow up Appointment(s)/Referral(s): 


Lenny Pena MD [STAFF PHYSICIAN] - 11/22/22 (Home with Mars and GARETH drain 

please instruct)


Eddi Falcon [NON-STAFF] - As Needed


Discharge Disposition: HOME SELF-CARE

## 2022-11-20 NOTE — P.OP
Date of Procedure: 11/16/22


Preoperative Diagnosis: 


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


Postoperative Diagnosis: 


Same


Procedure(s) Performed: 


Left distal ureterectomy, left ureteral reimplant with psoas hitch, left pelvic 

lymphadenectomy


Anesthesia: SISSY


Surgeon: Lenny Pena


Assistant #1: Ulises Calhoun


Estimated Blood Loss (ml): 100


IV fluids (ml): 1,800


Pathology: other (Left distal ureter, left pelvic lymph nodes)


Condition: stable


Disposition: PACU


Indications for Procedure: 


The patient is an 82-year-old white female who presented with a four-month 

history of intermittent gross hematuria associated with left flank pain.  

Computed tomography scan showed a questionable soft tissue filling defect within

the left distal ureter at the ureterovesical junction.  She underwent cystoscopy

with left ureteroscopy.  No bladder tumors were seen, but she was confirmed to 

have a large tumor within the left distal ureter.  Biopsies showed high-grade, 

noninvasive urothelial carcinoma.  She was offered the options of a left 

nephroureterectomy versus a left distal ureterectomy with reimplant and has 

elected to undergo the latter.


Operative Findings: 


Left distal ureteral tumor.  Negative ureteral margins.  No evidence of 

adenopathy.


Description of Procedure: 


The patient was taken to the operating room and placed in the supine position.  

A Mars catheter was placed sterilely, and positioned such that the nursing 

staff would have access to the catheter.  Warm sterile water was instilled into 

the bladder, and the catheter was clamped.  The abdomen was prepped and draped 

sterilely.  The scalpel was used to make a midline infraumbilical skin incision.

 The Bovie electrocautery was used to incise the subcutaneous fat and linea alba

in the midline.  Blunt dissection was utilized to develop the space of Retzius. 

The Bookwalter retractor was used for exposure.  The left ureter was palpated as

it coursed over the iliac vessels.  It was dissected distally.  Lateral bladder 

attachments were clipped and divided, allowing the ureter to be dissected down 

to the ureterovesical junction.  The distal ureter was thickened, but otherwise 

unremarkable.





A left pelvic lymph node dissection was performed in the standard fashion, using

a combination of sharp and blunt dissection.  Margins of dissection were the 

external iliac artery laterally, the obturator nerve medially, the circumflex 

iliac vein distally, and the bifurcation of the iliac vessels proximally.  

Lymphatics were clipped prior to dividing them.  There was no evidence of 

adenopathy.  There were no complications.





The Bovie electrocautery was used to make an anterior midline cystotomy 

incision.  Next, the Bovie electrocautery was used to incise the bladder mucosa 

circumferentially around the ureteral orifice.  The intramural portion of the 

ureter was then dissected away from the bladder.  Once the ureter was detached 

from the bladder, the ureter was transected proximal to the palpable tumor 

within the ureter.  The resected ureter was sent for frozen section, and the 

proximal margin was negative.





The bladder defect was closed in 2 layers.  2-0 Vicryl suture was used in a 

running fashion to close the muscle, and the mucosa was closed using 3-0 Vicryl 

suture in a running fashion.  An additional muscular layer closure was performed

from outside the bladder using 2-0 Vicryl suture.  The right side of the bladder

was then mobilized to allow a left psoas hitch to be performed.  3 2-0 Vicryl 

sutures were passed through the psoas muscle and the date reducer muscle of the 

left bladder dome, thus securing the left bladder dome to the psoas muscle.  A 

small opening was then made in a corresponding location of the bladder, allowing

the ureter to be passed into the bladder.  The mucosa was incised distally from 

the bladder opening, thus providing a trough for the ureter.  The ureter was 

longer than needed, and therefore an additional centimeter of the ureter was 

removed.  The remaining ureter was then spatulated anteriorly and secured to the

bladder using 5-0 Vicryl sutures in simple interrupted fashion.  Once the ureter

was secured to the bladder, a 22 cm, 6-Mauritanian double-J ureteral stent was 

placed.





The anterior cystotomy incision was closed in 2 layers.  The mucosa was closed 

using 3-0 Vicryl suture in a running fashion.  The muscle was closed using 2-0 

Vicryl suture in a running fashion.  The bladder was then filled with sterile 

water.  Some extravasation was noted at the point where the ureter entered the 

bladder, and Vicryl suture was then used to constrict the size of the opening 

such that there was no longer any extravasation.





A Armin-Garcia drain was placed within the left hemipelvis, and brought out 

through a separate stab incision to the left of the midline surgical incision.  

The drain was sutured to the skin using silk suture, and was later attached to 

bulb suction.  Hemostasis was noted to be excellent.  The linea alba was closed 

using #1 Vicryl suture in a running fashion.  The skin was closed using staples.

 A sterile gauze dressing was applied over the incision.  The Mars catheter was

connected to gravity drainage.  All sponge and needle counts were correct.  The 

patient tolerated the procedure well was taken to the recovery room in stable 

condition.

## 2023-04-04 ENCOUNTER — HOSPITAL ENCOUNTER (OUTPATIENT)
Dept: HOSPITAL 47 - RADCTMAIN | Age: 83
Discharge: HOME | End: 2023-04-04
Attending: UROLOGY
Payer: MEDICARE

## 2023-04-04 DIAGNOSIS — C66.2: Primary | ICD-10-CM

## 2023-04-04 DIAGNOSIS — K57.30: ICD-10-CM

## 2023-04-04 DIAGNOSIS — J44.9: ICD-10-CM

## 2023-04-04 LAB — BUN SERPL-SCNC: 20 MG/DL (ref 7–17)

## 2023-04-04 PROCEDURE — 74177 CT ABD & PELVIS W/CONTRAST: CPT

## 2023-04-04 PROCEDURE — 82565 ASSAY OF CREATININE: CPT

## 2023-04-04 PROCEDURE — 36415 COLL VENOUS BLD VENIPUNCTURE: CPT

## 2023-04-04 PROCEDURE — 84520 ASSAY OF UREA NITROGEN: CPT

## 2023-04-04 NOTE — CT
EXAMINATION TYPE: CT abdomen pelvis w con

CT DLP: 370.40 mGycm, Automated exposure control for dose reduction was used.

 

DATE OF EXAM: 4/4/2023 1:47 PM

 

COMPARISON: Renal ultrasound 3/8/23.

CLINICAL INDICATION:Female, 82 years old with history of C66.2 MALIGNANT NEOPLASM OF LEFT URETER; Hx 
LT ureter ca, cancer was removed and reattached. Hernia repair

 

TECHNIQUE:  Standard  CT of the abdomen and pelvis following the administration of 80 cc of Isovue 30
0 IV contrast material and oral contrast. Coronal and sagittal reformats were performed. 

 

FINDINGS: 

LOWER CHEST: Moderate centrilobular emphysematous changes. Mitral annulus calcifications.

 

ABDOMEN

LIVER: Unremarkable

GALLBLADDER AND BILE DUCTS: Unremarkable.

PANCREAS: Unremarkable.

SPLEEN: Unremarkable.

ADRENAL GLANDS: Right adrenal gland is unremarkable. Nonspecific thickening of the left adrenal gland
.

KIDNEYS AND URETERS: No evidence of hydronephrosis or renal calculus. The kidneys enhance symmetrical
ly without focal lesion. Prominent bilateral extrarenal pelvises. Contrast is demonstrated within bot
h collecting systems on the delayed phase.

 

PELVIS

BLADDER: Unremarkable

REPRODUCTIVE: Unremarkable.

 

ABDOMEN & PELVIS

STOMACH AND BOWEL: Stomach and duodenum are unremarkable. Distal colonic diverticulosis without evide
nce for acute diverticulitis. Enteric contrast reaches the sigmoid colon. No evidence of bowel obstru
ction. 

PERITONEUM: No evidence of pneumoperitoneum or free fluid. Postsurgical changes along the left pelvic
 sidewall from previous cancer resection and ureteral reimplant. There is a well-circumscribed cystic
 structure within the left anterior pelvis measuring 2.9 x 2.2 x 5.2 cm abutting the abdominal wall (
series 3, image 53).

 

VASCULATURE: Mild atherosclerotic calcifications are present throughout the abdominal aorta and its b
ranches. No evidence of aortic aneurysm. 

MUSCULOSKELETAL: No acute osseous abnormalities. Moderate disc degeneration changes are present throu
ghout the thoracolumbar spine. No acute osseous abnormality.

LYMPH NODES: No gross evidence for lymphadenopathy.

SOFT TISSUE/ABDOMINAL WALL: Unremarkable

 

IMPRESSION:

1.  Postsurgical changes from left ureter cancer and hernia repair. No definitive evidence for local 
recurrence. 

2. Left anterior pelvis well-circumscribed 5.2 cm fluid collection corresponding to prior ultrasound.
 This probably represents a peritoneal inclusion cyst/seroma in the setting of prior surgery. Follow 
up CT pelvis in 6 months is recommended to assess for stability.

3. Colonic diverticulosis without evidence for acute diverticulitis.

4. COPD changes.

## 2023-10-10 NOTE — BD
EXAMINATION TYPE: Axial Bone Density

 

DATE OF EXAM: 6/30/2021

 

COMPARISON: NONE

 

CLINICAL HISTORY: Postmenopausal

 

Height:  58

Weight:  131.6

 

FRAX RISK QUESTIONS:

Alcohol (3 or more units per day):  no

Family History (Parent hip fracture):  no

Glucocorticoids (More than 3mos):  no

           (Ex: prednisone, prednisolone, methylprednisolone, dexamethasone, and hydrocortisone).    
     

History of Fracture in Adulthood: no

Secondary Osteoporosis:

  1.  Type 1 Diabetes: no

  2.  Hyperthyroidism: no

  3.  Menopause before 45: no

  4.  Malnutrition: no

  5.  Chronic liver disease: no

Rheumatoid Arthritis: no

Current Tobacco Use: yes

 

RISK FACTORS 

HISTORY OF: 

Surgery to Spine/Hip(right/left)/Wrist (right/left): no

Family History of Osteoporosis: no

Active: yes

Diet low in dairy products/other sources of calcium:  no

Postmenopausal woman: age 49

Lost more than 2 inches in height since high school: no

 

MEDICATIONS: diltiazem, crestor

Thyroid Medications:  thyroid/levothyroxine

How Long: 15 years

 

 

Additional History:

 

 

EXAM MEASUREMENTS: 

Bone mineral densitometry was performed using the [employer] [facility].

Bone mineral density as measured about the Lumbar spine is:  

----- L1-L4(G/cm2): 1.453

T Score Values are as follows:

----- L2: 1.9

----- L3: 3.4

----- L4: 2.9

----- L1-L4: 2.3

Bone mineral density has: increased 6.1 % since study of: 1.18.2018

 

Bone mineral density about the R hip (g/cm2): 0.793

Bone mineral density about the L hip (g/cm2): 0.738

T Score values are as follows:

-----R Neck: -1.8

-----L Neck: -2.2

-----R Total: -0.8

-----L Total: -1.0

Bone mineral density has: decreased -7.6 % since study of: 1.18.2018

 

 

IMPRESSION:

Osteopenia (T Score between -2.5 and -1).

 

There is slightly increased risk of fracture and the patient may be considered 

for treatment. 

 

Re-Screen 2-5 years.

 

NOTE:  T-SCORE=SD OF THE YOUNG ADULT MEAN. Yes